# Patient Record
Sex: FEMALE | Race: WHITE | Employment: OTHER | URBAN - METROPOLITAN AREA
[De-identification: names, ages, dates, MRNs, and addresses within clinical notes are randomized per-mention and may not be internally consistent; named-entity substitution may affect disease eponyms.]

---

## 2017-02-03 ENCOUNTER — HOSPITAL ENCOUNTER (OUTPATIENT)
Dept: MAMMOGRAPHY | Age: 72
Discharge: HOME | End: 2017-02-03
Attending: INTERNAL MEDICINE
Payer: MEDICARE

## 2017-02-03 DIAGNOSIS — Z78.0 POST-MENOPAUSAL: ICD-10-CM

## 2017-02-03 PROCEDURE — 77080 DXA BONE DENSITY AXIAL: CPT

## 2017-02-09 NOTE — PROGRESS NOTES
DEXA bone density xray report reviewed and shows osteopenia with increased fracture risk. Recommend she begin fosamax 70 mg weekly or Evista 60 mg daily with repeat scan in 2 years.   Please inform the patient

## 2017-06-20 ENCOUNTER — HOSPITAL ENCOUNTER (OUTPATIENT)
Dept: MAMMOGRAPHY | Age: 72
Discharge: HOME | End: 2017-06-20
Attending: INTERNAL MEDICINE
Payer: MEDICARE

## 2017-06-20 DIAGNOSIS — Z12.31 ENCOUNTER FOR MAMMOGRAM TO ESTABLISH BASELINE MAMMOGRAM: ICD-10-CM

## 2017-06-20 PROCEDURE — 77067 SCR MAMMO BI INCL CAD: CPT

## 2017-07-31 ENCOUNTER — HOSPITAL ENCOUNTER (OUTPATIENT)
Dept: CARDIAC CATH/INVASIVE PROCEDURES | Age: 72
Discharge: HOME | End: 2017-07-31
Attending: INTERNAL MEDICINE | Admitting: INTERNAL MEDICINE
Payer: MEDICARE

## 2017-07-31 VITALS
RESPIRATION RATE: 22 BRPM | HEART RATE: 72 BPM | HEIGHT: 65 IN | WEIGHT: 138 LBS | DIASTOLIC BLOOD PRESSURE: 65 MMHG | TEMPERATURE: 98 F | SYSTOLIC BLOOD PRESSURE: 123 MMHG | BODY MASS INDEX: 22.99 KG/M2 | OXYGEN SATURATION: 95 %

## 2017-07-31 LAB
ANION GAP BLD CALC-SCNC: 6 MMOL/L (ref 7–16)
ATRIAL RATE: 64 BPM
BUN SERPL-MCNC: 22 MG/DL (ref 8–23)
CALCIUM SERPL-MCNC: 9.2 MG/DL (ref 8.3–10.4)
CALCULATED P AXIS, ECG09: 64 DEGREES
CALCULATED R AXIS, ECG10: 62 DEGREES
CALCULATED T AXIS, ECG11: 2 DEGREES
CHLORIDE SERPL-SCNC: 109 MMOL/L (ref 98–107)
CO2 SERPL-SCNC: 30 MMOL/L (ref 21–32)
CREAT SERPL-MCNC: 0.85 MG/DL (ref 0.6–1)
DIAGNOSIS, 93000: NORMAL
ERYTHROCYTE [DISTWIDTH] IN BLOOD BY AUTOMATED COUNT: 13.8 % (ref 11.9–14.6)
GLUCOSE SERPL-MCNC: 77 MG/DL (ref 65–100)
HCT VFR BLD AUTO: 42.9 % (ref 35.8–46.3)
HGB BLD-MCNC: 15 G/DL (ref 11.7–15.4)
INR PPP: 1.1 (ref 0.9–1.2)
MAGNESIUM SERPL-MCNC: 2.3 MG/DL (ref 1.8–2.4)
MCH RBC QN AUTO: 29 PG (ref 26.1–32.9)
MCHC RBC AUTO-ENTMCNC: 35 G/DL (ref 31.4–35)
MCV RBC AUTO: 83 FL (ref 79.6–97.8)
P-R INTERVAL, ECG05: 180 MS
PLATELET # BLD AUTO: 196 K/UL (ref 150–450)
PMV BLD AUTO: 10.7 FL (ref 10.8–14.1)
POTASSIUM SERPL-SCNC: 3.4 MMOL/L (ref 3.5–5.1)
PROTHROMBIN TIME: 12 SEC (ref 9.6–12)
Q-T INTERVAL, ECG07: 440 MS
QRS DURATION, ECG06: 116 MS
QTC CALCULATION (BEZET), ECG08: 453 MS
RBC # BLD AUTO: 5.17 M/UL (ref 4.05–5.25)
SODIUM SERPL-SCNC: 145 MMOL/L (ref 136–145)
VENTRICULAR RATE, ECG03: 64 BPM
WBC # BLD AUTO: 5 K/UL (ref 4.3–11.1)

## 2017-07-31 PROCEDURE — 80048 BASIC METABOLIC PNL TOTAL CA: CPT | Performed by: INTERNAL MEDICINE

## 2017-07-31 PROCEDURE — 85610 PROTHROMBIN TIME: CPT | Performed by: INTERNAL MEDICINE

## 2017-07-31 PROCEDURE — 85027 COMPLETE CBC AUTOMATED: CPT | Performed by: INTERNAL MEDICINE

## 2017-07-31 PROCEDURE — 74011250637 HC RX REV CODE- 250/637: Performed by: INTERNAL MEDICINE

## 2017-07-31 PROCEDURE — 93660 TILT TABLE EVALUATION: CPT

## 2017-07-31 PROCEDURE — 93005 ELECTROCARDIOGRAM TRACING: CPT | Performed by: INTERNAL MEDICINE

## 2017-07-31 PROCEDURE — 83735 ASSAY OF MAGNESIUM: CPT | Performed by: INTERNAL MEDICINE

## 2017-07-31 RX ORDER — SODIUM CHLORIDE 9 MG/ML
75 INJECTION, SOLUTION INTRAVENOUS CONTINUOUS
Status: DISCONTINUED | OUTPATIENT
Start: 2017-07-31 | End: 2017-07-31 | Stop reason: HOSPADM

## 2017-07-31 RX ORDER — NITROGLYCERIN 400 UG/1
1 SPRAY ORAL AS NEEDED
Status: DISCONTINUED | OUTPATIENT
Start: 2017-07-31 | End: 2017-07-31 | Stop reason: HOSPADM

## 2017-07-31 RX ADMIN — NITROGLYCERIN 1 SPRAY: 400 SPRAY ORAL at 11:14

## 2017-07-31 NOTE — PROGRESS NOTES
Discharge instructions given per orders, voiced good understanding of tilt table test care, medications & follow up care. Denies any questions. Discharged to home with .

## 2017-07-31 NOTE — H&P
[facility] Cardiology/Electrophyiology Consult                Date of  Admission: 7/31/2017  9:54 AM       CC/Reason for admission: syncope      Shruthi Phillips is a 67 y.o. female admitted for Syncope [R55]. Shruthi Phillips is a very pleasant 66 y.o. female with a past medical and cardiac history significant for syncope and had an ILR placed over a year ago and presents today for follow up of a near syncopal episode several days ago. Pt denies chest pain, worsening shortness of breath, orthopnea, PND, leg swelling, palpitations, fast or irregular heart rates. Pt describes being outside, feeling like her legs are going out, \"jello\", that she is going to [employer] down\", no associated lightheadedness, feels she cannot get her balance and episode typically lasts minutes. She took her blood pressure 30 minutes after the event and it was normal. Pt episodes have been intermittent for years, usually very infrequent, aggravated by hot weather and activity, no alleviating factors.     Cardiac PMH: (Old records have been reviewed and summarized below)    Patient Active Problem List   Diagnosis Code    Essential (primary) hypertension I10    Hyperlipidemia E78.5    Dysrhythmia, cardiac I49.9    Syncope R55    Osteopenia M85.80    Migraine G43.909       Past Medical History:   Diagnosis Date    Dysrhythmia, cardiac     Essential (primary) hypertension     Hyperlipidemia     Migraine     Osteoarthritis     Osteopenia     Last DEXA 4 years ago    Syncope     Neurocardiogenic- has implanted monitor    Varicella       Past Surgical History:   Procedure Laterality Date    HX COLONOSCOPY  01/01/2006    HX TONSIL AND ADENOIDECTOMY      HX WISDOM TEETH EXTRACTION       No Known Allergies   Family History   Problem Relation Age of Onset    Hypertension Mother     High Cholesterol Mother     Thyroid Disease Mother     Cancer Mother     Stroke Father     High Cholesterol Sister     Cancer Sister         Current Facility-Administered Medications   Medication Dose Route Frequency    nitroglycerin (NITROLINGUAL) sublingual 0.4 mg/spray  1 Spray SubLINGual PRN    0.9% sodium chloride infusion  75 mL/hr IntraVENous CONTINUOUS     Current Outpatient Prescriptions   Medication Sig    atenolol (TENORMIN) 50 mg tablet TK 1 T PO QD    amLODIPine (NORVASC) 2.5 mg tablet Take 1 Tab by mouth daily.  fenofibrate (LOFIBRA) 160 mg tablet Take 1 Tab by mouth daily.  MAGNESIUM CITRATE by Does Not Apply route.  Lactobacillus acidophilus (ACIDOPHILUS) cap Take 1 Cap by mouth daily.  coenzyme Q-10 (CO Q-10) 200 mg capsule Take  by mouth daily.  multivitamin with iron (DAILY MULTI-VITAMINS/IRON) tablet Take 1 Tab by mouth daily.  aspirin 81 mg chewable tablet Take 81 mg by mouth daily.  Cholecalciferol, Vitamin D3, (VITAMIN D3) 2,000 unit cap capsule Take  by mouth two (2) times a day.  cyanocobalamin 1,000 mcg tablet Take 1,000 mcg by mouth daily.  CALCIUM CITRATE/VITAMIN D3 (CALCIUM CITRATE + D PO) Take  by mouth.     SUMAtriptan (IMITREX) 50 mg tablet TK 1 T PO ONCE DAILY AS NEEDED FOR MIGRAINE    chlorhexidine (PERIDEX) 0.12 % solution SWISH AND SPIT TWICE A DAY       Review of Symptoms:  A comprehensive ROS was performed with the pertinent positives and negatives mentioned in the HPI, all other systems reviewed and are negative       Physical Exam  Vitals:    07/31/17 1125 07/31/17 1130 07/31/17 1145 07/31/17 1200   BP: 128/66 121/67 121/64 123/65   Pulse: 67 78 73 72   Resp: 16 (!) 49 24 22   Temp:       SpO2: 95%      Weight:       Height:           Physical Exam:  Gen: well appearing, well developed, NAD  Eyes: Pupils equal, EOMI  ENT: oropharynx clear, no oral lesions, normal dentition  CV: RRR, no M/R/G, PMI not palpable, normal JVD, no carotid bruits, normal distal pulses, no MITCHELL  Pulm: CTAB, no accessory muscle uses, no wheezes, crackles  GI: soft, NT, ND, +BS  Neuro: Alert and oriented    Cardiographics    Telemetry:   ECG (Indpendently visualized and interpreted):  Echocardiogram:     Labs:   Recent Labs      07/31/17   1040   NA  145   K  3.4*   MG  2.3   BUN  22   CREA  0.85   GLU  77   WBC  5.0   HGB  15.0   HCT  42.9   PLT  196   INR  1.1        Assessment:      Active Problems:    * No active hospital problems. *         Plan:   1. Syncope: presentation for planned TTT today    Thank you very much for this referral. We appreciate the opportunity to participate in this patient's care. We will follow along with above stated plan. Kia Branch MD, MS  Cardiology/Electrophysiology

## 2017-07-31 NOTE — DISCHARGE INSTRUCTIONS
After your tilt table test    Be sure someone else drives you home. You have no restrictions:   Return to your previous diet. Return to your previous activities. Continue current medications as instructed by Dr Maria Esther Germain        Call your doctor if:    · You are bleeding from your throat or mouth. · You have trouble breathing all of a sudden. · You have chest pain or any pain that spreads to your neck, jaw, or arms. · You have questions or concerns.   · You have a fever greater than 101°F.    Doctor: Maria Esther Germain    Special Instructions:

## 2017-07-31 NOTE — PROGRESS NOTES
Patient received to [address] [place] [address] room # 8  Ambulatory from [facility]. Patient scheduled for Tilt table study today with Dr Kaycee Montgomery. Procedure reviewed & questions answered, voiced good understanding consent obtained & placed on chart. All medications and medical history reviewed. Will prep patient per orders. Patient & family updated on plan of care.

## 2017-07-31 NOTE — PROCEDURES
Pre-Procedure Diagnosis  1. syncope     Procedure Performed  1. Tilt Table Test    Anesthesia: none    Specimens: none    Estimated Blood Loss: None, not applicable    Procedural Description: The patient was brought to the operating suite in a fasting, nonsedated state. The risks, benefits and alternatives of the procedure were reviewed with the patient, and final questions answered. Informed consent was confirmed. A procedural timeout was called and completed per institutional policy. Once appropriate monitors were applied, the patient blood pressure and heart rate were monitored for 10 minutes while supine. Pt was then tilted to 80 degrees and blood pressure and heart rates monitored for 10 minutes, followed by one dose of NTG and monitoring for another 10 minutes. Pt blood pressure and heart rates were stable and responded physiologically to the NTG challenge. Pt was normotensive throughout the procedure. Negative tilt table test.     Post Procedure Diagnosis: Normal Tilt Table Test    Suresh Branch MD, MS  Clinical Cardiac Electrophysiology

## 2017-07-31 NOTE — IP AVS SNAPSHOT
Hector Spence 
 
 
 [address] [facility] [address] [facility] 
[phone] Patient: Abdoulaye Reddy MRN: [id number] KLU:[id number] Discharge Summary 7/31/2017 Abdoulaye Reddy MRN[de-identified]  [id number] Admission Information Provider Pager Service Admission Date Expected D/C Date Jesus Aguero MD  CARDIAC CATH LAB 7/31/2017 Actual LOS Patient Class 0 days OUTPATIENT Follow-up Information Follow up With Details Comments Contact Info Jesus Aguero MD  Follow up with Dr Leonor Mendoza on Monday August 14th at 11:15am in the [place] office. If you need to make changes call the [facility] [address] [place] [address] 
[phone] Current Discharge Medication List  
  
ASK your doctor about these medications Dose & Instructions Dispensing Information Comments Morning Noon Evening Bedtime ACIDOPHILUS Cap Generic drug:  Lactobacillus acidophilus Your last dose was: Your next dose is:    
   
   
 Dose:  1 Cap Take 1 Cap by mouth daily. Refills:  0  
     
   
   
   
  
 amLODIPine 2.5 mg tablet Commonly known as:  Alexa Pall Your last dose was: Your next dose is:    
   
   
 Dose:  2.5 mg Take 1 Tab by mouth daily. Quantity:  30 Tab Refills:  3  
     
   
   
   
  
 aspirin 81 mg chewable tablet Your last dose was: Your next dose is:    
   
   
 Dose:  81 mg Take 81 mg by mouth daily. Refills:  0  
     
   
   
   
  
 atenolol 50 mg tablet Commonly known as:  TENORMIN Your last dose was: Your next dose is:    
   
   
 TK 1 T PO QD Quantity:  30 Tab Refills:  0  
     
   
   
   
  
 CALCIUM CITRATE + D PO Your last dose was: Your next dose is: Take  by mouth. Refills:  0  
     
   
   
   
  
 chlorhexidine 0.12 % solution Commonly known as:  PERIDEX Your last dose was: Your next dose is:    
   
   
 SWISH AND SPIT TWICE A DAY Refills:  1  
     
   
   
   
  
 coenzyme Q-10 200 mg capsule Commonly known as:  CO Q-10 Your last dose was: Your next dose is: Take  by mouth daily. Refills:  0  
     
   
   
   
  
 cyanocobalamin 1,000 mcg tablet Your last dose was: Your next dose is:    
   
   
 Dose:  1000 mcg Take 1,000 mcg by mouth daily. Refills:  0 DAILY MULTI-VITAMINS/IRON tablet Generic drug:  multivitamin with iron Your last dose was: Your next dose is:    
   
   
 Dose:  1 Tab Take 1 Tab by mouth daily. Refills:  0  
     
   
   
   
  
 fenofibrate 160 mg tablet Commonly known as:  LOFIBRA Your last dose was: Your next dose is:    
   
   
 Dose:  160 mg Take 1 Tab by mouth daily. Quantity:  30 Tab Refills:  3 MAGNESIUM CITRATE Your last dose was: Your next dose is:    
   
   
 by Does Not Apply route. Refills:  0 SUMAtriptan 50 mg tablet Commonly known as:  IMITREX Your last dose was: Your next dose is:    
   
   
 TK 1 T PO ONCE DAILY AS NEEDED FOR MIGRAINE Quantity:  30 Tab Refills:  3 VITAMIN D3 2,000 unit Cap capsule Generic drug:  Cholecalciferol (Vitamin D3) Your last dose was: Your next dose is: Take  by mouth two (2) times a day. Refills:  0 General Information Please provide this summary of care documentation to your next provider. Allergies No Known Allergies Current Immunizations  Reviewed on 4/7/2017 Name Date Influenza High Dose Vaccine PF 9/8/2016, 1/1/2016 Pneumococcal Conjugate (PCV-13) 1/1/2015 Pneumococcal Polysaccharide (PPSV-23) 3/1/2016, 1/1/2006 Tdap 1/1/2011 Zoster Vaccine, Live 1/1/2014 Discharge Instructions Discharge Instructions After your tilt table test 
 
Be sure someone else drives you home. You have no restrictions:  
Return to your previous diet. Return to your previous activities. Continue current medications as instructed by Dr Christo Ferrer Call your doctor if: 
 
· You are bleeding from your throat or mouth. · You have trouble breathing all of a sudden. · You have chest pain or any pain that spreads to your neck, jaw, or arms. · You have questions or concerns. · You have a fever greater than 101°F. 
 
Doctor: Christo Ferrer Special Instructions: 
 
 
 
 
Discharge Orders None  
  
` Patient Signature:  ____________________________________________________________ Date:  ____________________________________________________________  
  
 Sharon Ganesh Provider Signature:  ____________________________________________________________ Date:  ____________________________________________________________

## 2017-07-31 NOTE — ROUTINE PROCESS
TRANSFER - OUT REPORT:    Verbal report given to Madhuri Chaparro RN (name) on Basia White  being transferred to CPRU (unit) for routine progression of care       Report consisted of patients Situation, Background, Assessment and   Recommendations(SBAR). Information from the following report(s) Procedure Summary, MAR and Recent Results was reviewed with the receiving nurse. Lines:   Peripheral IV 07/31/17 Right Antecubital (Active)        Opportunity for questions and clarification was provided.       Patient transported with:   Registered Nurse     Tilt table study w/ Dr Yamileth Gonzáels

## 2018-08-21 ENCOUNTER — HOSPITAL ENCOUNTER (OUTPATIENT)
Dept: CARDIAC CATH/INVASIVE PROCEDURES | Age: 73
End: 2018-08-21
Payer: MEDICARE

## 2018-08-23 RX ORDER — ZINC GLUCONATE 10 MG
LOZENGE ORAL DAILY
COMMUNITY
End: 2019-02-20

## 2018-08-24 ENCOUNTER — ANESTHESIA (OUTPATIENT)
Dept: SURGERY | Age: 73
End: 2018-08-24
Payer: MEDICARE

## 2018-08-24 ENCOUNTER — HOSPITAL ENCOUNTER (OUTPATIENT)
Dept: CARDIAC CATH/INVASIVE PROCEDURES | Age: 73
Discharge: HOME | End: 2018-08-24
Attending: INTERNAL MEDICINE | Admitting: INTERNAL MEDICINE
Payer: MEDICARE

## 2018-08-24 ENCOUNTER — ANESTHESIA EVENT (OUTPATIENT)
Dept: SURGERY | Age: 73
End: 2018-08-24
Payer: MEDICARE

## 2018-08-24 VITALS
OXYGEN SATURATION: 98 % | RESPIRATION RATE: 16 BRPM | BODY MASS INDEX: 23.66 KG/M2 | HEIGHT: 65 IN | HEART RATE: 70 BPM | SYSTOLIC BLOOD PRESSURE: 129 MMHG | WEIGHT: 142 LBS | DIASTOLIC BLOOD PRESSURE: 77 MMHG | TEMPERATURE: 98.1 F

## 2018-08-24 LAB
ANION GAP SERPL CALC-SCNC: 6 MMOL/L (ref 7–16)
ATRIAL RATE: 72 BPM
BUN SERPL-MCNC: 25 MG/DL (ref 8–23)
CALCIUM SERPL-MCNC: 9.7 MG/DL (ref 8.3–10.4)
CALCULATED P AXIS, ECG09: 66 DEGREES
CALCULATED R AXIS, ECG10: 48 DEGREES
CALCULATED T AXIS, ECG11: 2 DEGREES
CHLORIDE SERPL-SCNC: 109 MMOL/L (ref 98–107)
CO2 SERPL-SCNC: 30 MMOL/L (ref 21–32)
CREAT SERPL-MCNC: 0.79 MG/DL (ref 0.6–1)
DIAGNOSIS, 93000: NORMAL
ERYTHROCYTE [DISTWIDTH] IN BLOOD BY AUTOMATED COUNT: 13.5 %
GLUCOSE SERPL-MCNC: 76 MG/DL (ref 65–100)
HCT VFR BLD AUTO: 43.6 % (ref 35.8–46.3)
HGB BLD-MCNC: 14.4 G/DL (ref 11.7–15.4)
INR PPP: 1.1
MAGNESIUM SERPL-MCNC: 2.3 MG/DL (ref 1.8–2.4)
MCH RBC QN AUTO: 29.3 PG (ref 26.1–32.9)
MCHC RBC AUTO-ENTMCNC: 33 G/DL (ref 31.4–35)
MCV RBC AUTO: 88.6 FL (ref 79.6–97.8)
NRBC # BLD: 0 K/UL (ref 0–0.2)
P-R INTERVAL, ECG05: 182 MS
PLATELET # BLD AUTO: 201 K/UL (ref 150–450)
PMV BLD AUTO: 10.9 FL (ref 9.4–12.3)
POTASSIUM SERPL-SCNC: 3.5 MMOL/L (ref 3.5–5.1)
PROTHROMBIN TIME: 13.4 SEC (ref 11.5–14.5)
Q-T INTERVAL, ECG07: 434 MS
QRS DURATION, ECG06: 124 MS
QTC CALCULATION (BEZET), ECG08: 475 MS
RBC # BLD AUTO: 4.92 M/UL (ref 4.05–5.2)
SODIUM SERPL-SCNC: 145 MMOL/L (ref 136–145)
VENTRICULAR RATE, ECG03: 72 BPM
WBC # BLD AUTO: 4.1 K/UL (ref 4.3–11.1)

## 2018-08-24 PROCEDURE — 77030012935 HC DRSG AQUACEL BMS -B

## 2018-08-24 PROCEDURE — 77030037400 HC ADH TISS HI VISC EXOFIN CHMP -B

## 2018-08-24 PROCEDURE — 93005 ELECTROCARDIOGRAM TRACING: CPT | Performed by: INTERNAL MEDICINE

## 2018-08-24 PROCEDURE — 83735 ASSAY OF MAGNESIUM: CPT

## 2018-08-24 PROCEDURE — 85610 PROTHROMBIN TIME: CPT

## 2018-08-24 PROCEDURE — 80048 BASIC METABOLIC PNL TOTAL CA: CPT

## 2018-08-24 PROCEDURE — 85027 COMPLETE CBC AUTOMATED: CPT

## 2018-08-24 PROCEDURE — 74011000250 HC RX REV CODE- 250: Performed by: INTERNAL MEDICINE

## 2018-08-24 PROCEDURE — 77030022704 HC SUT VLOC COVD -B

## 2018-08-24 PROCEDURE — 76060000032 HC ANESTHESIA 0.5 TO 1 HR: Performed by: INTERNAL MEDICINE

## 2018-08-24 PROCEDURE — 33284 HC RMVL PT CARD EVENT RECORD: CPT

## 2018-08-24 PROCEDURE — 74011250636 HC RX REV CODE- 250/636

## 2018-08-24 RX ORDER — PROPOFOL 10 MG/ML
INJECTION, EMULSION INTRAVENOUS
Status: DISCONTINUED | OUTPATIENT
Start: 2018-08-24 | End: 2018-08-24 | Stop reason: HOSPADM

## 2018-08-24 RX ORDER — PROPOFOL 10 MG/ML
INJECTION, EMULSION INTRAVENOUS AS NEEDED
Status: DISCONTINUED | OUTPATIENT
Start: 2018-08-24 | End: 2018-08-24 | Stop reason: HOSPADM

## 2018-08-24 RX ORDER — LIDOCAINE HYDROCHLORIDE 20 MG/ML
INJECTION, SOLUTION EPIDURAL; INFILTRATION; INTRACAUDAL; PERINEURAL AS NEEDED
Status: DISCONTINUED | OUTPATIENT
Start: 2018-08-24 | End: 2018-08-24 | Stop reason: HOSPADM

## 2018-08-24 RX ORDER — BUPIVACAINE HYDROCHLORIDE AND EPINEPHRINE 5; 5 MG/ML; UG/ML
30 INJECTION, SOLUTION EPIDURAL; INTRACAUDAL; PERINEURAL ONCE
Status: COMPLETED | OUTPATIENT
Start: 2018-08-24 | End: 2018-08-24

## 2018-08-24 RX ORDER — SODIUM CHLORIDE, SODIUM LACTATE, POTASSIUM CHLORIDE, CALCIUM CHLORIDE 600; 310; 30; 20 MG/100ML; MG/100ML; MG/100ML; MG/100ML
INJECTION, SOLUTION INTRAVENOUS
Status: DISCONTINUED | OUTPATIENT
Start: 2018-08-24 | End: 2018-08-24 | Stop reason: HOSPADM

## 2018-08-24 RX ORDER — CEFAZOLIN SODIUM/WATER 2 G/20 ML
2 SYRINGE (ML) INTRAVENOUS ONCE
Status: DISCONTINUED | OUTPATIENT
Start: 2018-08-24 | End: 2018-08-24 | Stop reason: HOSPADM

## 2018-08-24 RX ADMIN — BUPIVACAINE HYDROCHLORIDE AND EPINEPHRINE BITARTRATE 150 MG: 5; .005 INJECTION, SOLUTION EPIDURAL; INTRACAUDAL; PERINEURAL at 10:50

## 2018-08-24 RX ADMIN — PROPOFOL 30 MG: 10 INJECTION, EMULSION INTRAVENOUS at 10:19

## 2018-08-24 RX ADMIN — SODIUM CHLORIDE, SODIUM LACTATE, POTASSIUM CHLORIDE, CALCIUM CHLORIDE: 600; 310; 30; 20 INJECTION, SOLUTION INTRAVENOUS at 10:19

## 2018-08-24 RX ADMIN — PROPOFOL 75 MCG/KG/MIN: 10 INJECTION, EMULSION INTRAVENOUS at 10:19

## 2018-08-24 RX ADMIN — LIDOCAINE HYDROCHLORIDE 40 MG: 20 INJECTION, SOLUTION EPIDURAL; INFILTRATION; INTRACAUDAL; PERINEURAL at 10:19

## 2018-08-24 NOTE — ANESTHESIA PREPROCEDURE EVALUATION
Anesthetic History   No history of anesthetic complications            Review of Systems / Medical History  Patient summary reviewed and pertinent labs reviewed    Pulmonary  Within defined limits            Pertinent negatives: No shortness of breath     Neuro/Psych         Headaches     Cardiovascular    Hypertension: well controlled          Hyperlipidemia  Pertinent negatives: No CAD and dysrhythmias  Exercise tolerance: >4 METS  Comments: Echo 2017 showing normal LVEF and dilated LA. Syncopal episode and has had palpitations. Apparently has had no events on loop recorder.    GI/Hepatic/Renal  Within defined limits              Endo/Other        Arthritis     Other Findings            Physical Exam    Airway  Mallampati: II  TM Distance: 4 - 6 cm  Neck ROM: normal range of motion   Mouth opening: Normal     Cardiovascular  Regular rate and rhythm,  S1 and S2 normal,  no murmur, click, rub, or gallop             Dental  No notable dental hx       Pulmonary  Breath sounds clear to auscultation               Abdominal  GI exam deferred       Other Findings            Anesthetic Plan    ASA: 3  Anesthesia type: general          Induction: Intravenous  Anesthetic plan and risks discussed with: Patient

## 2018-08-24 NOTE — DISCHARGE INSTRUCTIONS
LOOP MONITOR REMOVAL INSTRUCTIONS SHEET      Activity  · As tolerated and directed by your doctor  · Bathe or shower as directed by your doctor (24 HOURS)    Diet  · Resume your previous diet     Pain  ·  Call your doctor if pain is NOT relieved by OTC medication    Dressing Care: Leave dressing on the incision until your follow up appointment . If dressing becomes loose,  You may remove it. Keep area Clean & dry, you can shower no direct contact with the water let water run over incision. Do not apply any lotions, creams or powder to incision. Follow-Up Phone Calls  · Will be made nursing staff  · If you have any problems, call your doctor as needed    Call your doctor if  · Excessive bleeding that does not stop after holding mild pressure over the area  · Temperature of 101 degrees F or above  · Redness,excessive swelling or bruising, and/or green or yellow, smelly discharge from incision    After Anesthesia  · For the first 24 hours: DO NOT Drive, Drink alcoholic beverages, or Make important decisions  · Be aware of dizziness following anesthesia and while taking pain medication    Medications - Continue home medications as previously prescribed     Other Instructions- Always show the doctor or dentist your loop recorder identification card.

## 2018-08-24 NOTE — IP AVS SNAPSHOT
Tripp Bolton 
 
 
 [address] 
[phone] Patient: Janet Pickett MRN: [id number] YPX:8[birth year] Discharge Summary 8/24/2018 Janet Pickett MRN[de-identified]  [id number] Admission Information Provider Pager Service Admission Date Expected D/C Date Meena Jung MD  CARDIAC CATH LAB 8/24/2018 8/24/2018 Actual LOS Patient Class 0 days OUTPATIENT Follow-up Information Follow up With Details Comments Contact Info Flora Roper MD   [address] [id number] [phone] Meena Jung MD On 9/4/2018 at 1:00pm in the [facility] office, For suture removal [address]  [address] [facility] [address] 
[phone] My Medications TAKE these medications as instructed Instructions Each Dose to Equal  
 Morning Noon Evening Bedtime  
 amLODIPine 2.5 mg tablet Commonly known as:  Mayito Haven Your last dose was: Your next dose is: Take 1 Tab by mouth daily. 2.5 mg  
    
   
   
   
  
 aspirin delayed-release 81 mg tablet Your last dose was: Your next dose is: Take 81 mg by mouth daily. 81 mg  
    
   
   
   
  
 atenolol 50 mg tablet Commonly known as:  TENORMIN Your last dose was: Your next dose is:    
   
   
 TK 1 T PO QD  
     
   
   
   
  
 CALCIUM CITRATE + D PO Your last dose was: Your next dose is: Take  by mouth daily. cyanocobalamin 1,000 mcg tablet Your last dose was: Your next dose is: Take 1,000 mcg by mouth daily. 1000 mcg DAILY MULTI-VITAMINS/IRON tablet Generic drug:  multivitamin with iron Your last dose was: Your next dose is: Take 1 Tab by mouth daily. 1 Tab  
    
   
   
   
  
 fenofibrate 160 mg tablet Commonly known as:  LOFIBRA Your last dose was: Your next dose is: Take 1 Tab by mouth daily. 160 mg  
    
   
   
   
  
 magnesium 250 mg Tab Your last dose was: Your next dose is: Take  by mouth daily. SUMAtriptan 50 mg tablet Commonly known as:  IMITREX Your last dose was: Your next dose is:    
   
   
 TK 1 T PO ONCE DAILY AS NEEDED FOR MIGRAINE  
     
   
   
   
  
 VITAMIN D3 2,000 unit Cap capsule Generic drug:  Cholecalciferol (Vitamin D3) Your last dose was: Your next dose is: Take  by mouth daily. General Information Please provide this summary of care documentation to your next provider. Allergies No Known Allergies Current Immunizations  Reviewed on 8/17/2018 Name Date Influenza High Dose Vaccine PF 9/8/2016, 1/1/2016 Pneumococcal Conjugate (PCV-13) 1/1/2015 Pneumococcal Polysaccharide (PPSV-23) 3/1/2016, 1/1/2006 Tdap 1/1/2011 Zoster Vaccine, Live 1/1/2014 Discharge Instructions Discharge Instructions LOOP MONITOR REMOVAL INSTRUCTIONS SHEET Activity · As tolerated and directed by your doctor · Bathe or shower as directed by your doctor (24 HOURS) Diet · Resume your previous diet Pain ·  Call your doctor if pain is NOT relieved by OTC medication Dressing Care: Leave dressing on the incision until your follow up appointment . If dressing becomes loose,  You may remove it. Keep area Clean & dry, you can shower no direct contact with the water let water run over incision. Do not apply any lotions, creams or powder to incision. Follow-Up Phone Calls · Will be made nursing staff · If you have any problems, call your doctor as needed Call your doctor if 
· Excessive bleeding that does not stop after holding mild pressure over the area · Temperature of 101 degrees F or above · Redness,excessive swelling or bruising, and/or green or yellow, smelly discharge from incision After Anesthesia · For the first 24 hours: DO NOT Drive, Drink alcoholic beverages, or Make important decisions · Be aware of dizziness following anesthesia and while taking pain medication Medications - Continue home medications as previously prescribed Other Instructions- Always show the doctor or dentist your loop recorder identification card. Discharge Orders None  
  
` Patient Signature:  ____________________________________________________________ Date:  ____________________________________________________________  
  
 [facility] Provider Signature:  ____________________________________________________________ Date:  ____________________________________________________________

## 2018-09-18 ENCOUNTER — HOSPITAL ENCOUNTER (OUTPATIENT)
Dept: MRI IMAGING | Age: 73
Discharge: HOME | End: 2018-09-18
Attending: PSYCHIATRY & NEUROLOGY
Payer: MEDICARE

## 2018-09-18 DIAGNOSIS — R56.9 SEIZURE (HCC): ICD-10-CM

## 2018-09-18 DIAGNOSIS — R42 LIGHT HEADED: ICD-10-CM

## 2018-09-18 DIAGNOSIS — R55 SYNCOPE, UNSPECIFIED SYNCOPE TYPE: ICD-10-CM

## 2018-09-18 PROCEDURE — 70553 MRI BRAIN STEM W/O & W/DYE: CPT

## 2018-09-18 PROCEDURE — 74011250636 HC RX REV CODE- 250/636: Performed by: PSYCHIATRY & NEUROLOGY

## 2018-09-18 PROCEDURE — A9575 INJ GADOTERATE MEGLUMI 0.1ML: HCPCS | Performed by: PSYCHIATRY & NEUROLOGY

## 2018-09-18 RX ORDER — GADOTERATE MEGLUMINE 376.9 MG/ML
12 INJECTION INTRAVENOUS
Status: COMPLETED | OUTPATIENT
Start: 2018-09-18 | End: 2018-09-18

## 2018-09-18 RX ORDER — SODIUM CHLORIDE 0.9 % (FLUSH) 0.9 %
10 SYRINGE (ML) INJECTION
Status: COMPLETED | OUTPATIENT
Start: 2018-09-18 | End: 2018-09-18

## 2018-09-18 RX ADMIN — Medication 10 ML: at 14:11

## 2018-09-18 RX ADMIN — GADOTERATE MEGLUMINE 12 ML: 376.9 INJECTION INTRAVENOUS at 14:11

## 2019-04-16 ENCOUNTER — HOSPITAL ENCOUNTER (OUTPATIENT)
Dept: MAMMOGRAPHY | Age: 74
Discharge: HOME | End: 2019-04-16
Attending: INTERNAL MEDICINE
Payer: MEDICARE

## 2019-04-16 DIAGNOSIS — M85.80 OSTEOPENIA, UNSPECIFIED LOCATION: ICD-10-CM

## 2019-04-16 DIAGNOSIS — Z78.0 POSTMENOPAUSAL: ICD-10-CM

## 2019-04-16 PROCEDURE — 77080 DXA BONE DENSITY AXIAL: CPT

## 2019-04-17 NOTE — PROGRESS NOTES
DEXA bine density scan report reviewed and shows osteopenia with increased FRAX score for a hip fracture in the next 10 years. Recommend she consider anti-resorptive therapy with Prolia.  Please inform the patient